# Patient Record
Sex: FEMALE | Race: WHITE | Employment: OTHER | URBAN - METROPOLITAN AREA
[De-identification: names, ages, dates, MRNs, and addresses within clinical notes are randomized per-mention and may not be internally consistent; named-entity substitution may affect disease eponyms.]

---

## 2022-01-31 ENCOUNTER — TRANSCRIBE ORDER (OUTPATIENT)
Dept: SCHEDULING | Age: 87
End: 2022-01-31

## 2022-01-31 DIAGNOSIS — M47.814 THORACIC SPONDYLOSIS: ICD-10-CM

## 2022-01-31 DIAGNOSIS — M81.0 OSTEOPOROSIS, UNSPECIFIED OSTEOPOROSIS TYPE, UNSPECIFIED PATHOLOGICAL FRACTURE PRESENCE: Primary | ICD-10-CM

## 2022-01-31 DIAGNOSIS — M51.34 DDD (DEGENERATIVE DISC DISEASE), THORACIC: ICD-10-CM

## 2022-01-31 DIAGNOSIS — S22.000S COMPRESSION FRACTURE OF THORACIC VERTEBRA, UNSPECIFIED THORACIC VERTEBRAL LEVEL, SEQUELA: ICD-10-CM

## 2022-02-17 ENCOUNTER — HOSPITAL ENCOUNTER (OUTPATIENT)
Dept: MRI IMAGING | Age: 87
Discharge: HOME | End: 2022-02-17
Attending: NURSE PRACTITIONER
Payer: MEDICARE

## 2022-02-17 DIAGNOSIS — M51.34 DDD (DEGENERATIVE DISC DISEASE), THORACIC: ICD-10-CM

## 2022-02-17 DIAGNOSIS — M81.0 OSTEOPOROSIS, UNSPECIFIED OSTEOPOROSIS TYPE, UNSPECIFIED PATHOLOGICAL FRACTURE PRESENCE: ICD-10-CM

## 2022-02-17 DIAGNOSIS — M47.814 THORACIC SPONDYLOSIS: ICD-10-CM

## 2022-02-17 DIAGNOSIS — S22.000S COMPRESSION FRACTURE OF THORACIC VERTEBRA, UNSPECIFIED THORACIC VERTEBRAL LEVEL, SEQUELA: ICD-10-CM

## 2022-02-17 PROCEDURE — 72146 MRI CHEST SPINE W/O DYE: CPT

## 2022-04-22 ENCOUNTER — OFFICE VISIT (OUTPATIENT)
Dept: ENDOCRINOLOGY | Age: 87
End: 2022-04-22
Payer: MEDICARE

## 2022-04-22 VITALS
BODY MASS INDEX: 23.85 KG/M2 | OXYGEN SATURATION: 98 % | SYSTOLIC BLOOD PRESSURE: 193 MMHG | WEIGHT: 121.5 LBS | DIASTOLIC BLOOD PRESSURE: 48 MMHG | HEIGHT: 60 IN | HEART RATE: 56 BPM

## 2022-04-22 DIAGNOSIS — M85.9 DISORDER OF BONE DENSITY AND STRUCTURE, UNSPECIFIED: ICD-10-CM

## 2022-04-22 DIAGNOSIS — M80.88XA OTHER OSTEOPOROSIS WITH CURRENT PATHOLOGICAL FRACTURE, VERTEBRA(E), INITIAL ENCOUNTER FOR FRACTURE (HCC): ICD-10-CM

## 2022-04-22 DIAGNOSIS — M80.00XD OSTEOPOROSIS WITH CURRENT PATHOLOGICAL FRACTURE WITH ROUTINE HEALING, UNSPECIFIED OSTEOPOROSIS TYPE, SUBSEQUENT ENCOUNTER: Primary | ICD-10-CM

## 2022-04-22 PROCEDURE — G8536 NO DOC ELDER MAL SCRN: HCPCS | Performed by: INTERNAL MEDICINE

## 2022-04-22 PROCEDURE — G8420 CALC BMI NORM PARAMETERS: HCPCS | Performed by: INTERNAL MEDICINE

## 2022-04-22 PROCEDURE — 1090F PRES/ABSN URINE INCON ASSESS: CPT | Performed by: INTERNAL MEDICINE

## 2022-04-22 PROCEDURE — G8427 DOCREV CUR MEDS BY ELIG CLIN: HCPCS | Performed by: INTERNAL MEDICINE

## 2022-04-22 PROCEDURE — 99204 OFFICE O/P NEW MOD 45 MIN: CPT | Performed by: INTERNAL MEDICINE

## 2022-04-22 PROCEDURE — G8432 DEP SCR NOT DOC, RNG: HCPCS | Performed by: INTERNAL MEDICINE

## 2022-04-22 PROCEDURE — 1101F PT FALLS ASSESS-DOCD LE1/YR: CPT | Performed by: INTERNAL MEDICINE

## 2022-04-22 RX ORDER — GABAPENTIN 300 MG/1
300 CAPSULE ORAL 3 TIMES DAILY
COMMUNITY
Start: 2022-04-20

## 2022-04-22 RX ORDER — NALOXONE HYDROCHLORIDE 4 MG/.1ML
SPRAY NASAL
COMMUNITY
End: 2022-04-22 | Stop reason: ALTCHOICE

## 2022-04-22 RX ORDER — TRAMADOL HYDROCHLORIDE 50 MG/1
TABLET ORAL
COMMUNITY

## 2022-04-22 RX ORDER — AMLODIPINE BESYLATE 5 MG/1
TABLET ORAL
COMMUNITY

## 2022-04-22 RX ORDER — LORAZEPAM 0.5 MG/1
TABLET ORAL
COMMUNITY
Start: 2022-02-23 | End: 2022-04-22 | Stop reason: ALTCHOICE

## 2022-04-22 RX ORDER — SERTRALINE HYDROCHLORIDE 50 MG/1
TABLET, FILM COATED ORAL
COMMUNITY
Start: 2021-08-05

## 2022-04-22 RX ORDER — ALBUTEROL SULFATE 90 UG/1
2 AEROSOL, METERED RESPIRATORY (INHALATION)
COMMUNITY
Start: 2022-02-02

## 2022-04-22 RX ORDER — LABETALOL 100 MG/1
TABLET, FILM COATED ORAL
COMMUNITY

## 2022-04-22 NOTE — PATIENT INSTRUCTIONS
Calcium Content of Selected Foods    Dairy and Soy Amount Calcium (mg)   Milk (skim, low fat, whole) 1 cup 300   Buttermilk 1 cup 300   Cottage Cheese . 5 cup 65   Ice Cream or Ice Milk . 5 cup 100   Sour Cream, cultured 1 cup 250   Soy Milk, calcium fortified 1 cup 200 to 400   Yogurt 1 cup 450   Yogurt drink 12 oz 300   [place] Instant Breakfast 1 packet 250   Hot Cocoa, calcium fortified 1 packet 320   Nonfat dry milk powder 5 Tbsp 300   Brie Cheese 1 oz 50   Hard Cheese (cheddar, yulisa) 1 oz 200   Mozzarella 1 oz 200   Parmesan Cheese 1 Tbsp 70   Swiss or Gruyere 1 oz 270     Vegetables   [place] squash, cooked 1 cup 90   Arugula, raw 1 cup 125   Bok Birgit, raw 1 cup 40   Broccoli, cooked 1 cup 180   Chard or Okra, cooked 1 cup 100   Chicory (curly endive), raw 1 cup 40   Raul greens 1 cup 50   Corn, brine packed 1 cup 10   Dandelion greens, raw 1 cup 80   Kale, raw 1 cup 55   Kelp or Kombe 1 cup 60   Mustard greens 1 cup 40   Spinach, cooked 1 cup 240   Turnip greens, raw 1 cup 80     Fruits   Figs, dried, uncooked 1 cup 300   Kiwi, raw 1 cup 50   Orange juice, calcium fortified 8 oz 300   Orange juice, from concentrate 1 cup 20     Legumes   Garbanzo Beans, cooked 1 cup 80   Legumes, general, cooked . 5 cup 15 to 50   Deng Beans, cooked 1 cup 75   Soybeans, boiled . 5 cup 100   Temphe . 5 cup 75   Tofu, firm, calcium set 4 oz 250 to 750   Tofu, soft regular 4 oz 120 to 390   White Beans, cooked . 5 cup 70     Grains   Cereals (calcium fortified) . 5 to 1 cup 250 to 1000   Amaranth, cooked . 5 cup 135   Bread, calcium fortified 1 slice 878 to 632   Brown rice, long grain, raw 1 cup 50   Oatmeal, instant 1 package 100 to 150   Tortillas, corn 2 85     Nuts and Seeds   Almonds, toasted unblanched 1 oz 80   Sesame seeds, whole roasted 1 oz 280   Sesame tahini 1 oz (2 Tbsp) 130   Sunflower seeds, dried 1 oz 50     Fish   Mackerel, canned 3 oz 250   [place], canned, with bones 3 oz 170 to 210   Sardines 3 oz 370     Other vahe Reis 1 Tbsp 135   * When range is given, calcium content varies by product. * The calcium content of plant foods is varied. Most vegetables, legumes, nuts, seeds, and dried fruit contain some calcium. Listed are selected significant sources of well-absorbed calcium. References:   FORVM, Handbook 8 palm program    Sonya and Gnosticist     How Much Do You Need?    Age Calcium (mg)   3- 1year old 500 mg   3- 6year old 800 mg   5- 25year old 1300 mg   23- 48year old 1000 mg   46- 79year old 1200 mg   > 79year old 1200 mg     [email or web address]

## 2022-04-23 LAB
25(OH)D3+25(OH)D2 SERPL-MCNC: 53.2 NG/ML (ref 30–100)
BUN SERPL-MCNC: 13 MG/DL (ref 10–36)
BUN/CREAT SERPL: 18 (ref 12–28)
CALCIUM SERPL-MCNC: 10.5 MG/DL (ref 8.7–10.3)
CHLORIDE SERPL-SCNC: 90 MMOL/L (ref 96–106)
CO2 SERPL-SCNC: 21 MMOL/L (ref 20–29)
CREAT SERPL-MCNC: 0.72 MG/DL (ref 0.57–1)
EGFR: 78 ML/MIN/1.73
GLUCOSE SERPL-MCNC: 95 MG/DL (ref 65–99)
POTASSIUM SERPL-SCNC: 5.4 MMOL/L (ref 3.5–5.2)
SODIUM SERPL-SCNC: 129 MMOL/L (ref 134–144)

## 2022-05-03 ENCOUNTER — DOCUMENTATION ONLY (OUTPATIENT)
Dept: ENDOCRINOLOGY | Age: 87
End: 2022-05-03

## 2022-05-03 DIAGNOSIS — M80.00XD OSTEOPOROSIS WITH CURRENT PATHOLOGICAL FRACTURE WITH ROUTINE HEALING, UNSPECIFIED OSTEOPOROSIS TYPE, SUBSEQUENT ENCOUNTER: Primary | ICD-10-CM

## 2022-05-03 DIAGNOSIS — E83.52 HYPERCALCEMIA: ICD-10-CM

## 2022-05-03 DIAGNOSIS — E87.5 HYPERKALEMIA: ICD-10-CM

## 2022-05-05 ENCOUNTER — HOSPITAL ENCOUNTER (OUTPATIENT)
Dept: MAMMOGRAPHY | Age: 87
Discharge: HOME | End: 2022-05-05
Attending: INTERNAL MEDICINE
Payer: MEDICARE

## 2022-05-05 DIAGNOSIS — M80.00XD OSTEOPOROSIS WITH CURRENT PATHOLOGICAL FRACTURE WITH ROUTINE HEALING, UNSPECIFIED OSTEOPOROSIS TYPE, SUBSEQUENT ENCOUNTER: ICD-10-CM

## 2022-05-05 DIAGNOSIS — M85.9 DISORDER OF BONE DENSITY AND STRUCTURE, UNSPECIFIED: ICD-10-CM

## 2022-05-05 DIAGNOSIS — M80.88XA OTHER OSTEOPOROSIS WITH CURRENT PATHOLOGICAL FRACTURE, VERTEBRA(E), INITIAL ENCOUNTER FOR FRACTURE (HCC): ICD-10-CM

## 2022-05-05 PROCEDURE — 77080 DXA BONE DENSITY AXIAL: CPT

## 2022-05-06 ENCOUNTER — TELEPHONE (OUTPATIENT)
Dept: ENDOCRINOLOGY | Age: 87
End: 2022-05-06

## 2022-05-06 LAB
BUN SERPL-MCNC: 14 MG/DL (ref 10–36)
BUN/CREAT SERPL: 22 (ref 12–28)
CALCIUM SERPL-MCNC: 10.1 MG/DL (ref 8.7–10.3)
CHLORIDE SERPL-SCNC: 93 MMOL/L (ref 96–106)
CO2 SERPL-SCNC: 21 MMOL/L (ref 20–29)
CREAT SERPL-MCNC: 0.63 MG/DL (ref 0.57–1)
EGFR: 82 ML/MIN/1.73
GLUCOSE SERPL-MCNC: 92 MG/DL (ref 65–99)
POTASSIUM SERPL-SCNC: 5 MMOL/L (ref 3.5–5.2)
PTH-INTACT SERPL-MCNC: 37 PG/ML (ref 15–65)
SODIUM SERPL-SCNC: 134 MMOL/L (ref 134–144)

## 2022-05-06 NOTE — TELEPHONE ENCOUNTER
Called pt's daughter to let her know labs were normal on repeat.     Clari Moreno 346 [facility] End[facility]

## 2022-05-23 ENCOUNTER — DOCUMENTATION ONLY (OUTPATIENT)
Dept: ENDOCRINOLOGY | Age: 87
End: 2022-05-23

## 2022-05-23 RX ORDER — ABALOPARATIDE 2000 UG/ML
80 INJECTION, SOLUTION SUBCUTANEOUS DAILY
Qty: 4.68 ML | Refills: 2 | Status: SHIPPED | OUTPATIENT
Start: 2022-05-23 | End: 2022-06-22 | Stop reason: SDUPTHER

## 2022-05-23 NOTE — PROGRESS NOTES
Spoke with René Choi about [employer], the need to sit after the first 10-15 doses a couple of hours due to risk of fracture, fact that we use this for 2 years. René Choi thinks Abran Hines would be able to use a daily medication.     Kiara Ernst, [facility] 346 Diabetes [facility] Endocrinology

## 2022-05-23 NOTE — LETTER
5/23/2022    Ms. Delphine Pappas  [address] [facility] [address] [place] [address] [address] [address]      Dear Delphine Pappas:    Please find your most recent results below. Resulted Orders   DEXA BONE DENSITY STUDY AXIAL    Narrative    Bone Mineral Density    Indication: Osteoporosis. Age: 80.  Sex: Female. Menopause status: Postmenopausal.. Hormone replacement therapy: No.    Number of falls in the past year: None. .  Risk factors for osteoporosis: History of low trauma fracture. Current medication for osteoporosis: None. Comparison: None. Technique: Imaging was performed on the [employer]. Excluded sites: L2 and L3 due to kyphoplasty    Findings:    Fractures identified on Lateral scanogram: T11, T7, L2, L3, and L4. Femoral Neck Left: . Bone mineral density (gm/cm2): 0.496 g/cm? .  % of peak bone mass: 48%. % for age matched controls: 80%. T-score: -3.9 .  Z-score: -0.9 . Femoral Neck Right: . Bone mineral density (gm/cm2): 0.471 g/cm? .  % of peak bone mass: 45%. % for age matched controls:  76%. T-score: -4.1 . Z-score: -1.1 . Total Hip Left: . Bone mineral density (gm/cm2): 0.418 g/cm? .  % of peak bone mass: 41%.  % for age matched controls: 66%. T-score: -4.7 .  Z-score: -1.7 . Total Hip Right: . Bone mineral density (gm/cm2): 0.442 g/cm? .  % of peak bone mass: 44%. % for age matched controls:  70%. T-score: -4.5 .  Z-score: -1.5 . Lumbar Spine: L1 and L4. Bone mineral density (gm/cm2): 0.764 g/cm? .  % of peak bone mass: 65%. % for age matched controls: 87%. T-score: -3.4.  Z-score: -0.9.      33% Radius Right: . Bone mineral density (gm/cm2): 0.496 g/cm? .  % of peak bone mass: 57%. % for age matched controls: 94%. T-score: -4.3 .  Z-score: -0.4 . Impression    This patient is osteoporotic using the World Health Organization criteria    Recommendations:  Therapy recommendations need to be tailored to each individual patient.  Using  the [employer] (WHO) FRAX absolute fracture algorithm, the  [facility] recommends beginning pharmacological therapy in  postmenopausal women and men over the age of 48 with a 8 year probability of a  hip fracture of >3% OR with the 10 year probability of a major osteoporotic  fracture of >20%. Please reconsider testing based on risk factors. Currently, Medicare will only  reimburse for a central DXA examination every two years, unless the patient is  on chronic glucocorticoid therapy. Note: Please note that reliable, valid comparisons cannot be made between  studies which have been performed on machines from different manufacturers. If  clinically warranted, a follow up study performed at this site, on the same  unit, would allow the most sensitive assessment of change in bone mineral  density. RECOMMENDATIONS:    Deb Hager,    I just got off the phone with your daughter Cathryn العلي. Your osteoporosis is quite severe. I recommend a daily medication called Tymlos. I discussed with Cathryn العلي the fact that you have to remain seated for a couple hours, especially after the first 10-15 doses, as Tymlos can sometimes cause a little dizziness when it is first begun.      Please call me if you have any questions: [phone]    Sincerely,      Rakesh Amador MD

## 2022-06-02 ENCOUNTER — TELEPHONE (OUTPATIENT)
Dept: ENDOCRINOLOGY | Age: 87
End: 2022-06-02

## 2022-06-02 NOTE — TELEPHONE ENCOUNTER
No but t score is -4.7 which is extremely low and bisphosphonate nowhere near strong enough.     Yon Becerril, [facility] End[facility]

## 2022-06-07 ENCOUNTER — TELEPHONE (OUTPATIENT)
Dept: ENDOCRINOLOGY | Age: 87
End: 2022-06-07

## 2022-06-07 NOTE — TELEPHONE ENCOUNTER
Spoke with Juan aCrlos Alexandre and she stated that should would like to speak with Dr Corrina Worrell regarding questions she has concerning the Tymlos medication. Juan Carlos Alexandre is requesting a return call today or tomorrow.  Juan Carlos Alexandre can be reached at [phone]

## 2022-06-07 NOTE — TELEPHONE ENCOUNTER
6/7/2022  12:52 PM    Patient's daughter octavio giles called and left message on machine at 12.21 stating that she would like a call back from dr Jordan Yu concerning her mother's medication.   Please call her at [phone] thanks

## 2022-06-09 NOTE — TELEPHONE ENCOUNTER
Tymlos will cost 600$ monthly after insurance for [place]. Rep will provide 10 months worth of samples. Called Jl Combs to let her know we will dispense 3 pens at a time.     Clari Pendleton 346 [facility] End[facility]

## 2022-06-20 ENCOUNTER — TRANSCRIBE ORDER (OUTPATIENT)
Dept: SCHEDULING | Age: 87
End: 2022-06-20

## 2022-06-20 DIAGNOSIS — M54.16 LUMBAR RADICULOPATHY: Primary | ICD-10-CM

## 2022-06-20 DIAGNOSIS — M81.0 OSTEOPOROSIS, UNSPECIFIED OSTEOPOROSIS TYPE, UNSPECIFIED PATHOLOGICAL FRACTURE PRESENCE: ICD-10-CM

## 2022-06-22 RX ORDER — ABALOPARATIDE 2000 UG/ML
80 INJECTION, SOLUTION SUBCUTANEOUS DAILY
Qty: 4.68 ML | Refills: 0 | Status: SHIPPED | COMMUNITY
Start: 2022-06-22 | End: 2022-09-20

## 2022-06-22 NOTE — PROGRESS NOTES
Will provide 4 tymlos sample pens, called Charmaine Hernandez to tell her to come pick them up.  Left vm.     Clari Calvert 346 [facility] End[facility]

## 2022-06-23 ENCOUNTER — TELEPHONE (OUTPATIENT)
Dept: ENDOCRINOLOGY | Age: 87
End: 2022-06-23

## 2022-06-23 NOTE — TELEPHONE ENCOUNTER
Called patient and verified name and date of birth. Confirmed that it is ok for  to  with the patient, no further concerns at this time.      Signed By: Hayward Habermann     June 23, 2022

## 2022-06-23 NOTE — TELEPHONE ENCOUNTER
6/23/2022  9:47 AM      Pt daughter Juan Carlos Alexandre called because she got a call yesterday from  that her mom medicine was ready for . Juan Carlos Alexandre was calling to let us know her  will be coming in about an hour to  her mom's medication. Juan Carlos Alexandre was calling to make sure its ok for her  to  the pt's medication. Juan Carlos Alexandre Connor#[phone]  Juan Carlso Alexandre  [address] Connor#[phone]      Thanks,  Kell Muller

## 2022-06-30 ENCOUNTER — HOSPITAL ENCOUNTER (OUTPATIENT)
Dept: MRI IMAGING | Age: 87
Discharge: HOME | End: 2022-06-30
Attending: PHYSICAL MEDICINE & REHABILITATION
Payer: MEDICARE

## 2022-06-30 DIAGNOSIS — M54.16 LUMBAR RADICULOPATHY: ICD-10-CM

## 2022-06-30 DIAGNOSIS — M81.0 OSTEOPOROSIS, UNSPECIFIED OSTEOPOROSIS TYPE, UNSPECIFIED PATHOLOGICAL FRACTURE PRESENCE: ICD-10-CM

## 2022-06-30 PROCEDURE — 72148 MRI LUMBAR SPINE W/O DYE: CPT

## 2022-10-03 ENCOUNTER — TELEPHONE (OUTPATIENT)
Dept: ENDOCRINOLOGY | Age: 87
End: 2022-10-03

## 2022-10-03 NOTE — TELEPHONE ENCOUNTER
10/3/2022    Juice Sarika called and left a vm at 10:04 am stating her mother was giving a 3 month supply of Tymols by Dr. Boyd. Pt will be running out on Oct 22nd. Ms. Alice Talbot is calling to see of Dr. Boyd has another month supply. Ms. Alice Talbot will be going out of town after 10/07/2022 and would like to know if she can  another supply. She can be reached at [phone].     Thanks,   Helena Gonzales

## 2022-10-04 NOTE — TELEPHONE ENCOUNTER
Grace Benton was made aware that  samples of the Tymlos medication are available and that she can stop by the office during during our office hours. Office hours were then given and she voiced understanding to what was stated.

## 2022-11-01 ENCOUNTER — TELEPHONE (OUTPATIENT)
Dept: ENDOCRINOLOGY | Age: 87
End: 2022-11-01

## 2022-11-01 NOTE — TELEPHONE ENCOUNTER
Rebecca Bernal was notified of Dr Azael Griffith message and she voiced understanding of what was read to her.

## 2022-11-01 NOTE — TELEPHONE ENCOUNTER
MD Jamaal Donald  Caller: Unspecified (Today,  9:49 AM)  It's okay to miss it for about a week. Hopefully she will be discharged by then. If not, they can bring it into the hospital and give it to her from the home supply.

## 2022-11-01 NOTE — TELEPHONE ENCOUNTER
11/1/2022  9:49 AM      Malvin Broussard the pt daughter called on behalf of pt. Ervin Morgan stated her mom is in the hospital with covid. The pt is suppose to take tymlos everyday and have been 2 days so far without an injection. Is there anything they can do or what does  recommend. Lauren#[phone]      Thanks,  Alberto Givens

## 2022-11-16 ENCOUNTER — TELEPHONE (OUTPATIENT)
Dept: ENDOCRINOLOGY | Age: 87
End: 2022-11-16

## 2022-11-16 NOTE — TELEPHONE ENCOUNTER
11/16/2022  10:40 AM    Pt daughter octavio parikh called and left message at 9.59 stating that she would like a call back from nurse or doctor concerning her mother's side affects from her medication.   Please give her a call back to [phone]

## 2022-11-16 NOTE — TELEPHONE ENCOUNTER
11/16/2022    Ranny Robertson called and left a voicemail at 11:27 am stating she is calling regarding the pt's prescription Tymlos. She can be reached at [phone].     Thanks,   Chun Elder

## 2022-11-18 NOTE — TELEPHONE ENCOUNTER
Shilpa Perez called 11/18 @ 2:05 PM.    Pts daughter returning nurse's call.     Daughter# [phone]

## 2022-11-18 NOTE — TELEPHONE ENCOUNTER
11/18/2022    Saima Barbosa from [facility] called and left a voicemail at 9:33 am stating she is calling regarding Ms. Freya Contreras. Pt can be reached at [phone].     Thanks,   Kyle Rudolph

## 2022-11-18 NOTE — TELEPHONE ENCOUNTER
11/18/2022    Brannon Mason called and left a voicemail at 9:36 am stating she is calling regarding her mother being on Tymlos. The patient has been having episodes and she has questions for the provider. Brannon Mason can be reached at [phone].     Thanks,   Derek Mayers

## 2022-11-21 NOTE — TELEPHONE ENCOUNTER
I spoke with Kath Walker and she stated that due her mother being back in forth in the hospital, her mother has been off of her Tymlos medication for 6 days and prior to that, she was off for 5 days. Please advise on if and when she should start back to taking the Tymlos medication.

## 2022-11-22 NOTE — TELEPHONE ENCOUNTER
[facility] was made aware of provider's message and she stated that when she meets with her mother providers on tomorrow, she will make them aware of Dr Montrell Conti recommendations.

## 2022-12-06 ENCOUNTER — TELEPHONE (OUTPATIENT)
Dept: ENDOCRINOLOGY | Age: 87
End: 2022-12-06

## 2022-12-06 NOTE — TELEPHONE ENCOUNTER
12/6/2022    Ms. Kaiser Croon called and left a voicemail at 9:07 am stating her mother is doing well. She has surpassed COVID and back on the Tymlos. Her mother is on her last box. Dr. Zina Shukla pt can get a 9 month supply of Tymlos and Ms. Kaiser Ramírez believe there is 3 more months at the office. Ms. Kaiser Ramírez would like Dr. Lesa Anna nurse or Dr. Lesa Anna to check. Pt will finish her last box on 12/30. Ms. Kaiser Ramírez can be reached at [phone].     Thanks,   Kimi Sheriff

## 2022-12-19 ENCOUNTER — TELEPHONE (OUTPATIENT)
Dept: ENDOCRINOLOGY | Age: 87
End: 2022-12-19

## 2022-12-19 NOTE — TELEPHONE ENCOUNTER
Spoke with Filippo Brought the rep for the Tymolos medication and she stated that our shipment should arrive on tomorrow (Tuesday). Nima Noe was then made aware and encouraged to stop by the office on Wednesday to  the medication.

## 2022-12-19 NOTE — TELEPHONE ENCOUNTER
.td  9:34 AM    Daughter Braeden Thompson is requesting a call. Checking on the status of Tymlos samples for patient. Can be reached at [phone].     Thanks,  Wing Damon

## 2022-12-27 RX ORDER — ABALOPARATIDE 2000 UG/ML
80 INJECTION, SOLUTION SUBCUTANEOUS DAILY
Qty: 3 BOX | Refills: 0 | Status: SHIPPED | COMMUNITY
Start: 2022-12-27

## 2022-12-27 NOTE — TELEPHONE ENCOUNTER
12/27/2022    Francalazarus Bhatia called and left a voicemail at 9:38 am stating she is returning irvin's call regarding the Tymlos shipment. She can be reached at [phone].     Thanks,   Derek Mayers

## 2022-12-28 ENCOUNTER — TELEPHONE (OUTPATIENT)
Dept: ENDOCRINOLOGY | Age: 87
End: 2022-12-28

## 2022-12-28 NOTE — TELEPHONE ENCOUNTER
Called and and spoke with Naye Juares making her aware that she can stop by the office today to  her mother's Tymlos samples.

## 2023-02-06 ENCOUNTER — TELEPHONE (OUTPATIENT)
Dept: ENDOCRINOLOGY | Age: 88
End: 2023-02-06

## 2023-02-06 NOTE — TELEPHONE ENCOUNTER
Pt daughter called 2/6 @ 3:22 PM    She would like to talk to the dr about her mothers tymlos medication and her health. She thinks the medication is making her health worse but does not know what taking her off would do either.     Daughter# [phone]

## 2023-02-06 NOTE — TELEPHONE ENCOUNTER
Please let her know that Dr. Stewart Pile it away until next week and since she knows her better, I think it's reasonable to stop the tymlos this week and then Dr. Terry Woo can review this note and determine a plan when she returns next week.

## 2023-02-06 NOTE — TELEPHONE ENCOUNTER
Spoke with Shanae Sunra, pt's daughter and she stated that since mother started back on the tymlos, she has been very weak and her headaches have gotten worse. Shanae Sunra stated that her mom is now refusing to take continue that medication until she speaks with a doctor. Please advise.        Daughter# [phone]

## 2023-02-07 NOTE — TELEPHONE ENCOUNTER
Thank you Dr Huseyin Spence. Gaurav Luong was made aware on yesterday that Dr Roberth Aguilar will be out of the office until next week but she wanted to still get a provider's opinion of what to do. Gaurav Luong, the pt's daughter was made aware of your message and voiced understanding to what was requested.

## 2023-02-14 RX ORDER — ALENDRONATE SODIUM 70 MG/1
70 TABLET ORAL
Qty: 12 TABLET | Refills: 2 | Status: SHIPPED | OUTPATIENT
Start: 2023-02-14

## 2023-02-14 NOTE — TELEPHONE ENCOUNTER
Took tymlos June/July/Aug/ Sept- had COVID and was on BiPAP- went to [facility]- didn't receive tymlos- used homw supply. First day she did it with Daughter looked strange- next day could not move her R side- called nurses- went back to hospital- did not have stroke. Remained in hospital for 3-4 days. Returned to skilled nursing, resumed it. Released Dec 22nd, had episodes where after injection couldn't speak. Got weak to the point after injecting she collapsed. Not sure if tymlos of COVID- started taking regularly until last week. Having headaches with tymlos. Had another episode after it. Next day walking in kitchen and collapsed. Does have reflux, takes prilosec. Start fosamax once weekly. Reviewed appropriate admin.     Clari Maurer 346 [facility] End[facility]

## 2023-02-27 ENCOUNTER — TELEPHONE (OUTPATIENT)
Dept: ENDOCRINOLOGY | Age: 88
End: 2023-02-27

## 2023-02-27 NOTE — TELEPHONE ENCOUNTER
No dysphagia. Said sx were similar to tymlos- knees and ankles gave way- happened 2x per week. Stopped all meds- feeling better.     Bibi Aceves, [facility]

## 2023-02-27 NOTE — TELEPHONE ENCOUNTER
Per Ashley Prado, 1 hour after her mother started the new rx, alendronate she fell. She then started having severe headaches, muscle pain and extreme weakness. Pt then stopped the medication and has been feeling well. Pt is aware of what can happened without the medication but feels that this is not the life she desires to have for the remainder of her life. Please advise.

## 2023-05-20 RX ORDER — LABETALOL 100 MG/1
TABLET, FILM COATED ORAL
COMMUNITY

## 2023-05-20 RX ORDER — ASPIRIN 81 MG/1
TABLET ORAL DAILY
COMMUNITY

## 2023-05-20 RX ORDER — ALENDRONATE SODIUM 70 MG/1
70 TABLET ORAL
COMMUNITY
Start: 2023-02-14

## 2023-05-20 RX ORDER — OMEPRAZOLE 20 MG/1
20 TABLET, DELAYED RELEASE ORAL DAILY
COMMUNITY

## 2023-05-20 RX ORDER — AMLODIPINE BESYLATE 5 MG/1
TABLET ORAL
COMMUNITY

## 2023-05-20 RX ORDER — ALBUTEROL SULFATE 90 UG/1
2 AEROSOL, METERED RESPIRATORY (INHALATION) EVERY 6 HOURS PRN
COMMUNITY
Start: 2022-02-02

## 2023-05-20 RX ORDER — TRAMADOL HYDROCHLORIDE 50 MG/1
TABLET ORAL
COMMUNITY

## 2023-05-20 RX ORDER — IRBESARTAN 300 MG/1
300 TABLET ORAL NIGHTLY
COMMUNITY

## 2023-05-20 RX ORDER — GABAPENTIN 300 MG/1
300 CAPSULE ORAL 3 TIMES DAILY
COMMUNITY
Start: 2022-04-20

## 2023-08-15 NOTE — PROGRESS NOTES
Chief Complaint   Patient presents with    New Patient    Osteoporosis     History of Present Illness: Lata Cano is a 80 y.o. female with a past medical history significant for hypertension seen in referral from orthopedics for discussion related to osteoporosis management. Debborah Runner underwent a total hysterectomy and was placed on exogenous estrogens thereafter. She believes that she was taking this until about 8 years ago at which point it was discontinued. Does have her native hips. Patient's daughter reports that she began experiencing falls in 2018. Previously was an avid dancer, enjoys doing moves such as the [employer]. Incurred several vertebral fractures, chronicity of which are unclear. Received 1 dose of Prolia and then self discontinued due to the risk of osteonecrosis of the jaw that she read. Went to the dentist about 3 months ago and did not have any extractions at that time. Has no pending implantations. Has noted loss of height with advancing age. Believes that she has had a DEXA around the year 2018. No history of radiation or chemotherapy. Is currently taking vitamin D supplementation. Past Medical History:   Diagnosis Date    Hypertension     Microscopic hematuria     Osteoporosis      Past Surgical History:   Procedure Laterality Date    HX HYSTERECTOMY       Current Outpatient Medications   Medication Sig    amLODIPine (NORVASC) 5 mg tablet amlodipine 5 mg tablet   TAKE 1 TABLET BY MOUTH EVERY DAY    gabapentin (NEURONTIN) 300 mg capsule Take 300 mg by mouth three (3) times daily.     labetaloL (NORMODYNE) 100 mg tablet labetalol 100 mg tablet   TAKE 1 TABLET BY MOUTH TWICE DAILY    sertraline (ZOLOFT) 50 mg tablet sertraline 50 mg tablet   TAKE 1 TABLET BY MOUTH EVERY DAY    traMADoL (ULTRAM) 50 mg tablet tramadol 50 mg tablet   TAKE 1 TABLET BY MOUTH FOUR TIMES DAILY FOR 7 DAYS AS NEEDED    acetaminophen 500 mg chew Take 500 mg by mouth every six (6) hours as needed.  albuterol (PROVENTIL HFA, VENTOLIN HFA, PROAIR HFA) 90 mcg/actuation inhaler Take 2 Puffs by inhalation every six (6) hours as needed.  OTHER zatidor 0.03% once daily    fluticasone furoate (ARNUITY ELLIPTA) 100 mcg/actuation dsdv inhaler Take 1 Puff by inhalation daily.  aspirin delayed-release 81 mg tablet Take  by mouth daily.  irbesartan (AVAPRO) 300 mg tablet Take 300 mg by mouth nightly.  omeprazole (PRILOSEC OTC) 20 mg tablet Take 20 mg by mouth daily.  cholecalciferol, vitamin D3, (VITAMIN D3) 2,000 unit tab Take  by mouth. No current facility-administered medications for this visit. Allergies   Allergen Reactions    Spironolactone Other (comments)     Family History   Problem Relation Age of Onset    Hypertension Mother     Osteoporosis Mother     Coronary Art Dis Mother     Diabetes Father     Sudden Death Father     Alzheimer's Disease Sister     Cancer Sister     Lung Disease Brother     Osteoporosis Brother        Social Hx: [place]  Daughter here with her      Review of Systems:  - See HPI    - Physical Examination:  Visit Vitals  BP (!) 193/48   Pulse (!) 56   Ht 5' (1.524 m)   Wt 121 lb 8 oz (55.1 kg)   SpO2 98%   BMI 23.73 kg/m²     -   - - GENERAL: NCAT, Appears thin, uses walker to ambulate  - EYES: EOMI, non-icteric, no proptosis   - Ear/Nose/Throat: NCAT, no visible inflammation or masses   - CARDIOVASCULAR: no cyanosis, no visible JVD   - RESPIRATORY: respiratory effort normal without any distress or labored breathing   - MUSCULOSKELETAL: Normal ROM of neck and upper extremities observed   - SKIN: No rash on face  - NEUROLOGIC:  No facial asymmetry (Cranial nerve 7 motor function), No gaze palsy   - PSYCHIATRIC: Normal affect, Normal insight and judgement     Data Reviewed:           Assessment/Plan:  This is a very pleasant 79-year-old female with a past medical history significant for multilevel vertebral fractures and hypertension seen in referral from Ortho for discussion related to medication management of this condition. Previously received 1 dose of Prolia then self discontinued due to concerns surrounding osteonecrosis of the jaw. Reviewed extensively the fact that Prolia cannot be discontinued due to an increased risk of vertebral fractures following discontinuation. Will obtain DEXA to decide between Tymlos and Prolia. Did go to the dentist 3 months ago and did not require any extractions or implantations. Explained that we could temporarily pause Prolia if extractions were required. Is currently taking vitamin D.     #Osteoporosis status post multiple vertebral fractures  -Received 1 dose of Prolia then self discontinued  -Will determine if we should resume Prolia versus Tymlos versus Forteo pending severity of T score  - VITAMIN D, 25 HYDROXY  - METABOLIC PANEL, BASIC  - DEXA BONE DENSITY STUDY AXIAL; Future  1. Osteoporosis with current pathological fracture with routine healing, unspecified osteoporosis type, subsequent encounter    2. Disorder of bone density and structure, unspecified     3. Other osteoporosis with current pathological fracture, vertebra(e), initial encounter for fracture [facility])       Patient Instructions     Calcium Content of Selected Foods    Dairy and Soy Amount Calcium (mg)   Milk (skim, low fat, whole) 1 cup 300   Buttermilk 1 cup 300   Cottage Cheese . 5 cup 65   Ice Cream or Ice Milk . 5 cup 100   Sour Cream, cultured 1 cup 250   Soy Milk, calcium fortified 1 cup 200 to 400   Yogurt 1 cup 450   Yogurt drink 12 oz 300   [place] Instant Breakfast 1 packet 250   Hot Cocoa, calcium fortified 1 packet 320   Nonfat dry milk powder 5 Tbsp 300   Brie Cheese 1 oz 50   Hard Cheese (cheddar, yulisa) 1 oz 200   Mozzarella 1 oz 200   Parmesan Cheese 1 Tbsp 70   Swiss or Gruyere 1 oz 270     Vegetables   [place] squash, cooked 1 cup 90   Arugula, raw 1 cup 125   Bok Birgit, raw 1 cup 40   Broccoli, cooked 1 cup 180 Chard or Okra, cooked 1 cup 100   Chicory (curly endive), raw 1 cup 40   Raul greens 1 cup 50   Corn, brine packed 1 cup 10   Dandelion greens, raw 1 cup 80   Kale, raw 1 cup 55   Kelp or Kombe 1 cup 60   Mustard greens 1 cup 40   Spinach, cooked 1 cup 240   Turnip greens, raw 1 cup 80     Fruits   Figs, dried, uncooked 1 cup 300   Kiwi, raw 1 cup 50   Orange juice, calcium fortified 8 oz 300   Orange juice, from concentrate 1 cup 20     Legumes   Garbanzo Beans, cooked 1 cup 80   Legumes, general, cooked . 5 cup 15 to 50   Deng Beans, cooked 1 cup 75   Soybeans, boiled . 5 cup 100   Temphe . 5 cup 75   Tofu, firm, calcium set 4 oz 250 to 750   Tofu, soft regular 4 oz 120 to 390   White Beans, cooked . 5 cup 70     Grains   Cereals (calcium fortified) . 5 to 1 cup 250 to 1000   Amaranth, cooked . 5 cup 135   Bread, calcium fortified 1 slice 057 to 806   Brown rice, long grain, raw 1 cup 50   Oatmeal, instant 1 package 100 to 150   Tortillas, corn 2 85     Nuts and Seeds   Almonds, toasted unblanched 1 oz 80   Sesame seeds, whole roasted 1 oz 280   Sesame tahini 1 oz (2 Tbsp) 130   Sunflower seeds, dried 1 oz 50     Fish   Mackerel, canned 3 oz 250   [place], canned, with bones 3 oz 170 to 210   Sardines 3 oz 370     Other   Molasses, blackstrap 1 Tbsp 135   * When range is given, calcium content varies by product. * The calcium content of plant foods is varied. Most vegetables, legumes, nuts, seeds, and dried fruit contain some calcium. Listed are selected significant sources of well-absorbed calcium. References:   [employer], Handbook 8 palm program    Sonya and Islam     How Much Do You Need? Age Calcium (mg)   3- 1year old 500 mg   3- 6year old 800 mg   5- 25year old 1300 mg   23- 48year old 1000 mg   46- 79year old 1200 mg   > 79year old 1200 mg     [email or web address]      RTC 6 mo    Copy sent to:  108 Sandy Paniagua MD  [address] [facility] Diabetes [facility] Endocrinology Negative
